# Patient Record
Sex: MALE | Race: ASIAN | NOT HISPANIC OR LATINO | URBAN - METROPOLITAN AREA
[De-identification: names, ages, dates, MRNs, and addresses within clinical notes are randomized per-mention and may not be internally consistent; named-entity substitution may affect disease eponyms.]

---

## 2018-11-15 ENCOUNTER — OFFICE VISIT (OUTPATIENT)
Dept: CARDIOLOGY | Facility: CLINIC | Age: 38
End: 2018-11-15
Payer: COMMERCIAL

## 2018-11-15 ENCOUNTER — HOSPITAL ENCOUNTER (OUTPATIENT)
Dept: CARDIOLOGY | Facility: CLINIC | Age: 38
Discharge: HOME | End: 2018-11-15
Payer: COMMERCIAL

## 2018-11-15 ENCOUNTER — LAB VISIT (OUTPATIENT)
Dept: LAB | Facility: HOSPITAL | Age: 38
End: 2018-11-15
Attending: INTERNAL MEDICINE
Payer: COMMERCIAL

## 2018-11-15 VITALS
HEIGHT: 67 IN | HEART RATE: 69 BPM | WEIGHT: 150.81 LBS | DIASTOLIC BLOOD PRESSURE: 81 MMHG | BODY MASS INDEX: 23.67 KG/M2 | SYSTOLIC BLOOD PRESSURE: 131 MMHG

## 2018-11-15 DIAGNOSIS — R00.2 PALPITATIONS: ICD-10-CM

## 2018-11-15 DIAGNOSIS — R00.2 PALPITATIONS: Primary | ICD-10-CM

## 2018-11-15 LAB
ANION GAP SERPL CALC-SCNC: 10 MMOL/L
BUN SERPL-MCNC: 22 MG/DL
CALCIUM SERPL-MCNC: 9.7 MG/DL
CHLORIDE SERPL-SCNC: 104 MMOL/L
CO2 SERPL-SCNC: 25 MMOL/L
CREAT SERPL-MCNC: 1 MG/DL
EST. GFR  (AFRICAN AMERICAN): >60 ML/MIN/1.73 M^2
EST. GFR  (NON AFRICAN AMERICAN): >60 ML/MIN/1.73 M^2
GLUCOSE SERPL-MCNC: 83 MG/DL
MAGNESIUM SERPL-MCNC: 2.3 MG/DL
POTASSIUM SERPL-SCNC: 3.7 MMOL/L
SODIUM SERPL-SCNC: 139 MMOL/L
TSH SERPL DL<=0.005 MIU/L-ACNC: 1.5 UIU/ML

## 2018-11-15 PROCEDURE — 93010 ELECTROCARDIOGRAM REPORT: CPT | Mod: S$GLB,,, | Performed by: INTERNAL MEDICINE

## 2018-11-15 PROCEDURE — 80048 BASIC METABOLIC PNL TOTAL CA: CPT

## 2018-11-15 PROCEDURE — 36415 COLL VENOUS BLD VENIPUNCTURE: CPT

## 2018-11-15 PROCEDURE — 93005 ELECTROCARDIOGRAM TRACING: CPT | Mod: S$GLB,,, | Performed by: INTERNAL MEDICINE

## 2018-11-15 PROCEDURE — 83735 ASSAY OF MAGNESIUM: CPT

## 2018-11-15 PROCEDURE — 99204 OFFICE O/P NEW MOD 45 MIN: CPT | Mod: S$GLB,,, | Performed by: INTERNAL MEDICINE

## 2018-11-15 PROCEDURE — 84443 ASSAY THYROID STIM HORMONE: CPT

## 2018-11-15 PROCEDURE — 99999 PR PBB SHADOW E&M-EST. PATIENT-LVL III: CPT | Mod: PBBFAC,,, | Performed by: INTERNAL MEDICINE

## 2018-11-15 PROCEDURE — 3008F BODY MASS INDEX DOCD: CPT | Mod: CPTII,S$GLB,, | Performed by: INTERNAL MEDICINE

## 2018-11-15 NOTE — PROGRESS NOTES
"Subjective:   Patient ID:  Mayur Garcia is a 38 y.o. 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 He first noticed them this past April while he was studying for his board exam. He also noted feeling stressed out and not sleeping at the time. He was also drinking more caffeine than usual. He reports feeling a "skipped" beat. His heart was not racing. The symptoms lasted on and off for a few weeks and then resolved. They recurred around September and occur every few days. He is currently drinking two cups of coffee a day. He stopped taking creatine. He does weight training routinely. He is otherwise asymptomatic. He has not had any syncopal events.    ECG: NSR 61 bpm with an incomplete right bundle branch block    History reviewed. No pertinent past medical history.    History reviewed. No pertinent surgical history.    Social History     Socioeconomic History    Marital status:      Spouse name: None    Number of children: None    Years of education: None    Highest education level: None   Social Needs    Financial resource strain: None    Food insecurity - worry: None    Food insecurity - inability: None    Transportation needs - medical: None    Transportation needs - non-medical: None   Occupational History    None   Tobacco Use    Smoking status: Never Smoker    Smokeless tobacco: Never Used   Substance and Sexual Activity    Alcohol use: No    Drug use: No    Sexual activity: None   Other Topics Concern    None   Social History Narrative    None       Family History   Problem Relation Age of Onset    Hypertension Father     Heart disease Father     Hyperlipidemia Father     Hypertension Maternal Grandmother     Diabetes Paternal Grandmother     Heart attack Neg Hx     Heart failure Neg Hx           Medication List      as of 11/15/2018  3:09 PM   " "  You have not been prescribed any medications.         Review of Systems   Constitution: Negative for weakness, malaise/fatigue and weight gain.   HENT: Negative for hearing loss.    Eyes: Negative for visual disturbance.   Cardiovascular: Positive for palpitations. Negative for chest pain, claudication, dyspnea on exertion, leg swelling, near-syncope, orthopnea, paroxysmal nocturnal dyspnea and syncope.   Respiratory: Negative for cough, shortness of breath, sleep disturbances due to breathing, snoring and wheezing.    Endocrine: Negative for cold intolerance, heat intolerance, polydipsia, polyphagia and polyuria.   Hematologic/Lymphatic: Negative for bleeding problem. Does not bruise/bleed easily.   Skin: Negative for rash and suspicious lesions.   Musculoskeletal: Negative for arthritis, falls, joint pain, muscle weakness and myalgias.   Gastrointestinal: Negative for abdominal pain, change in bowel habit, constipation, diarrhea, heartburn, hematochezia, melena and nausea.   Genitourinary: Negative for hematuria and nocturia.   Neurological: Negative for excessive daytime sleepiness, dizziness, headaches, light-headedness and loss of balance.   Psychiatric/Behavioral: Negative for depression. The patient is not nervous/anxious.    Allergic/Immunologic: Negative for environmental allergies.       /81 (BP Location: Left arm, Patient Position: Sitting, BP Method: Large (Automatic))   Pulse 69   Ht 5' 7" (1.702 m)   Wt 68.4 kg (150 lb 12.7 oz)   BMI 23.62 kg/m²     Objective:   Physical Exam   Constitutional: He is oriented to person, place, and time. He appears well-developed and well-nourished.        HENT:   Head: Normocephalic and atraumatic.   Mouth/Throat: Oropharynx is clear and moist.   Eyes: Conjunctivae and EOM are normal. Pupils are equal, round, and reactive to light. No scleral icterus.   Neck: Normal range of motion. Neck supple. No hepatojugular reflux and no JVD present. No tracheal " deviation present. No thyromegaly present.   Cardiovascular: Normal rate, regular rhythm, normal heart sounds and intact distal pulses. PMI is not displaced.   Pulses:       Carotid pulses are 2+ on the right side, and 2+ on the left side.       Radial pulses are 2+ on the right side, and 2+ on the left side.        Dorsalis pedis pulses are 2+ on the right side, and 2+ on the left side.        Posterior tibial pulses are 2+ on the right side, and 2+ on the left side.   Pulmonary/Chest: Effort normal and breath sounds normal.   Abdominal: Soft. Bowel sounds are normal. He exhibits no distension and no mass. There is no hepatosplenomegaly. There is no tenderness.   Musculoskeletal: He exhibits no edema or tenderness.   Lymphadenopathy:     He has no cervical adenopathy.   Neurological: He is alert and oriented to person, place, and time.   Skin: Skin is warm and dry. No rash noted. No cyanosis or erythema. Nails show no clubbing.   Psychiatric: He has a normal mood and affect. His speech is normal and behavior is normal.       No results found for: NA, K, CL, CO2, BUN, CREATININE, GLU, HGBA1C, MG, AST, ALT, ALBUMIN, PROT, BILITOT, WBC, HGB, HCT, MCV, PLT, INR, TSH, CHOL, HDL, LDLCALC, TRIG, BNP    Assessment:     1. Palpitations : It sounds like he is having PVC's or PAC's. I have ordered some baseline labs as well as a 24 hour holter monitor for further evaluation.       Plan:     Mayur was seen today for palpitations.    Diagnoses and all orders for this visit:    Palpitations  -     EKG 12-lead; Future  -     Holter monitor - 48 hour (Cupid Only); Future  -     TSH; Future  -     Basic metabolic panel; Future  -     Magnesium; Future        Thank you for allowing me to participate in this patient's care. Please do not hesitate to contact me with any questions or concerns.

## 2018-11-16 ENCOUNTER — PATIENT MESSAGE (OUTPATIENT)
Dept: CARDIOLOGY | Facility: CLINIC | Age: 38
End: 2018-11-16

## 2018-11-20 ENCOUNTER — CLINICAL SUPPORT (OUTPATIENT)
Dept: CARDIOLOGY | Facility: HOSPITAL | Age: 38
End: 2018-11-20
Attending: INTERNAL MEDICINE
Payer: COMMERCIAL

## 2018-11-20 DIAGNOSIS — R00.2 PALPITATIONS: ICD-10-CM

## 2018-11-20 PROCEDURE — 93227 XTRNL ECG REC<48 HR R&I: CPT | Mod: ,,, | Performed by: INTERNAL MEDICINE

## 2018-11-20 PROCEDURE — 93225 XTRNL ECG REC<48 HRS REC: CPT

## 2018-11-27 LAB
OHS CV EVENT MONITOR DAY: 0
OHS CV HOLTER LENGTH DECIMAL HOURS: 48
OHS CV HOLTER LENGTH HOURS: 48
OHS CV HOLTER LENGTH MINUTES: 0

## 2018-11-28 ENCOUNTER — PATIENT MESSAGE (OUTPATIENT)
Dept: CARDIOLOGY | Facility: CLINIC | Age: 38
End: 2018-11-28

## 2019-04-05 ENCOUNTER — TELEPHONE (OUTPATIENT)
Dept: OPTOMETRY | Facility: CLINIC | Age: 39
End: 2019-04-05

## 2019-04-05 NOTE — TELEPHONE ENCOUNTER
Called patient regarding his message to see  on 5/28/2019 at 7:30am informed him I left a message for  but it should be ok

## 2019-04-08 ENCOUNTER — TELEPHONE (OUTPATIENT)
Dept: OPTOMETRY | Facility: CLINIC | Age: 39
End: 2019-04-08

## 2019-05-28 ENCOUNTER — OFFICE VISIT (OUTPATIENT)
Dept: OPTOMETRY | Facility: CLINIC | Age: 39
End: 2019-05-28
Payer: COMMERCIAL

## 2019-05-28 ENCOUNTER — TELEPHONE (OUTPATIENT)
Dept: OPHTHALMOLOGY | Facility: CLINIC | Age: 39
End: 2019-05-28

## 2019-05-28 DIAGNOSIS — H40.003 GLAUCOMA SUSPECT OF BOTH EYES: ICD-10-CM

## 2019-05-28 DIAGNOSIS — Z13.5 SCREENING FOR EYE CONDITION: ICD-10-CM

## 2019-05-28 DIAGNOSIS — Z01.00 EXAMINATION OF EYES AND VISION: Primary | ICD-10-CM

## 2019-05-28 DIAGNOSIS — H52.13 MYOPIA OF BOTH EYES: ICD-10-CM

## 2019-05-28 PROCEDURE — 92015 PR REFRACTION: ICD-10-PCS | Mod: S$GLB,,, | Performed by: OPTOMETRIST

## 2019-05-28 PROCEDURE — 92250 FUNDUS PHOTOGRAPHY W/I&R: CPT | Mod: S$GLB,,, | Performed by: OPTOMETRIST

## 2019-05-28 PROCEDURE — 92250 COLOR FUNDUS PHOTOGRAPHY - OU - BOTH EYES: ICD-10-PCS | Mod: S$GLB,,, | Performed by: OPTOMETRIST

## 2019-05-28 PROCEDURE — 92015 DETERMINE REFRACTIVE STATE: CPT | Mod: S$GLB,,, | Performed by: OPTOMETRIST

## 2019-05-28 PROCEDURE — 99999 PR PBB SHADOW E&M-EST. PATIENT-LVL III: ICD-10-PCS | Mod: PBBFAC,,, | Performed by: OPTOMETRIST

## 2019-05-28 PROCEDURE — 99999 PR PBB SHADOW E&M-EST. PATIENT-LVL III: CPT | Mod: PBBFAC,,, | Performed by: OPTOMETRIST

## 2019-05-28 PROCEDURE — 92004 COMPRE OPH EXAM NEW PT 1/>: CPT | Mod: S$GLB,,, | Performed by: OPTOMETRIST

## 2019-05-28 PROCEDURE — 92004 PR EYE EXAM, NEW PATIENT,COMPREHESV: ICD-10-PCS | Mod: S$GLB,,, | Performed by: OPTOMETRIST

## 2019-05-28 NOTE — PATIENT INSTRUCTIONS
Glaucoma suspect in both eyes, based on the finding of larger-than-average cup-to-disc ratio in each eye, with normal intraocular pressure in each eye.  Dr. Garcia states that he has had visual field test done years ago.   Suggest repeat at this time..  Order stereo disc photos to be taken today.  Assistant to call to schedule Myers Visual Field (HVF) test (24-2 DEYSI standard) and OCT retinal nerve fiber layer thickness analysis, and follow-up visit with me (Dr. Jiménez) on the same date.    Otherwise, ocular health appears good in both eyes.    Slight myopia in each eye, with good best-corrected VA in each eye.  However, Dr. Garcia has very satisfactory unaided distance VA in each eye, so doubt compelling need to have spectacle lens Rx filled.    Repeat general eye exam and refraction in at least two years.

## 2019-05-28 NOTE — TELEPHONE ENCOUNTER
Called patient to schedule [facility] 24-2 [facility] std and an EP to see  [facility]    ----- Message from Serena Ponce sent at 5/28/2019 11:28 AM CDT -----  Contact: Mayur  Patient Returning Call from [facility]    Who Left Message for Patient:Ellis  Communication Preference:[phone]  Additional Information:  Mr. Garcia was returning your call. He can be reach at the number in the message.

## 2019-05-28 NOTE — PROGRESS NOTES
HPI     eye exam       Additional comments: In for general eye exam and refraction.  Notes no acute ocular/vision problems.  Distance VA (unaided) about the same, per Dr. Garcia.              Comments     Patient's age: 39 y.o. male  Occupation: resident in radiology   Approximate date of last eye examination:  12/2015  Name of last eye doctor seen: Dr. Cm  Wears glasses? no  Wears CLs?:  no    Headaches?  Tension headaches occasionally  Eye pain/discomfort?  no                                                                                     Flashes?  no  Floaters?  no  Diplopia/Double vision?  no  Patient's Ocular History:         Any eye surgeries? no         Any eye injury?  no         Any treatment for eye disease?  no  Family history of eye disease?  Grandmother:  cataracts  Significant patient medical history:         1. Diabetes?  no       If yes, IDDM or NIDDM? n/a   2. HBP?  no              3. Other (describe):  no   ! OTC eyedrops currently using:  no   ! Prescription eye meds currently using:  no   ! Any history of allergy/adverse reaction to any eye meds used   previously?  no    ! Any history of allergy/adverse reaction to eyedrops used during prior   eye exam(s)? no    ! Any history of allergy/adverse reaction to Novacaine or similar meds?   no   ! Any history of allergy/adverse reaction to Epinephrine or similar meds?   no    ! Patient okay with use of anesthetic eyedrops to check eye pressure?  no          ! Patient okay with use of eyedrops to dilate pupils today?  no   !  Allergies/Medications/Medical History/Family History reviewed today?    yes      PD =   64/60                                                                     Last edited by Landry Jiménez, OD on 5/28/2019  7:58 AM. (History)            Assessment /Plan     For exam results, see Encounter Report.    1. Examination of eyes and vision     2. Glaucoma suspect of both eyes  Color Fundus Photography - OU - Both Eyes     Myers Visual Field - OU - Extended - Both Eyes    Posterior Segment OCT Optic Nerve- Both eyes   3. Myopia of both eyes     4. Screening for eye condition                  Glaucoma suspect in both eyes, based on the finding of larger-than-average cup-to-disc ratio in each eye, with normal intraocular pressure in each eye.  Dr. Garcia states that he has had visual field test done years ago.   Suggest repeat at this time..  Order stereo disc photos to be taken today.  Assistant to call to schedule Myers Visual Field (HVF) test (24-2 DEYSI standard) and OCT retinal nerve fiber layer thickness analysis, and follow-up visit with me (Dr. Jiménez) on the same date.    Otherwise, ocular health appears good in both eyes.    Slight myopia in each eye, with good best-corrected VA in each eye.  However, Dr. Garcia has very satisfactory unaided distance VA in each eye, so doubt compelling need to have spectacle lens Rx filled.    Repeat general eye exam and refraction in at least two years.

## 2019-05-29 ENCOUNTER — OFFICE VISIT (OUTPATIENT)
Dept: OPTOMETRY | Facility: CLINIC | Age: 39
End: 2019-05-29
Payer: COMMERCIAL

## 2019-05-29 ENCOUNTER — CLINICAL SUPPORT (OUTPATIENT)
Dept: OPHTHALMOLOGY | Facility: CLINIC | Age: 39
End: 2019-05-29
Payer: COMMERCIAL

## 2019-05-29 DIAGNOSIS — H40.003 GLAUCOMA SUSPECT OF BOTH EYES: ICD-10-CM

## 2019-05-29 DIAGNOSIS — H40.003 GLAUCOMA SUSPECT OF BOTH EYES: Primary | ICD-10-CM

## 2019-05-29 PROCEDURE — 99999 PR PBB SHADOW E&M-EST. PATIENT-LVL II: ICD-10-PCS | Mod: PBBFAC,,, | Performed by: OPTOMETRIST

## 2019-05-29 PROCEDURE — 99499 UNLISTED E&M SERVICE: CPT | Mod: S$GLB,,, | Performed by: OPTOMETRIST

## 2019-05-29 PROCEDURE — 99999 PR PBB SHADOW E&M-EST. PATIENT-LVL II: CPT | Mod: PBBFAC,,, | Performed by: OPTOMETRIST

## 2019-05-29 PROCEDURE — 92083 EXTENDED VISUAL FIELD XM: CPT | Mod: S$GLB,,, | Performed by: OPTOMETRIST

## 2019-05-29 PROCEDURE — 92133 CPTRZD OPH DX IMG PST SGM ON: CPT | Mod: S$GLB,,, | Performed by: OPTOMETRIST

## 2019-05-29 PROCEDURE — 92083 HUMPHREY VISUAL FIELD - OU - BOTH EYES: ICD-10-PCS | Mod: S$GLB,,, | Performed by: OPTOMETRIST

## 2019-05-29 PROCEDURE — 92133 POSTERIOR SEGMENT OCT OPTIC NERVE(OCULAR COHERENCE TOMOGRAPHY) - OU - BOTH EYES: ICD-10-PCS | Mod: S$GLB,,, | Performed by: OPTOMETRIST

## 2019-05-29 PROCEDURE — 99499 NO LOS: ICD-10-PCS | Mod: S$GLB,,, | Performed by: OPTOMETRIST

## 2019-05-29 NOTE — PROGRESS NOTES
Assessment /Plan     For exam results, see Encounter Report.    1. Glaucoma suspect of both eyes                      Refer to previous notes dated 05/28/2019.  Prior diagnosis of bilateral glaucoma suspect, based on the finding of larger-than-average C/D ratios in both eyes, with normal IOP in each eye.  Dr. Garcia in today for review of results of HVF test (24-2) and OCT retinal nerve fiber layer (RNFL) thickness analysis done today.    RNFL thckness within normal range 360° and globally in each eye.    Normal HVF test results in each eye.   No evidence of glaucomatous visual field test results in either eye.    Discussed findings with Dr. Garcia.  No need to initiate treatment for IOP control/reduction in either eye.  Continue to monitor on regular basis.  Gave copy of OCT RNFL thickness analysis results and copy of Myers visual field test results to Dr. Garcia for his records, and for use as baseline in the future.

## 2019-05-29 NOTE — PATIENT INSTRUCTIONS
Refer to previous notes dated 05/28/2019.  Prior diagnosis of bilateral glaucoma suspect, based on the finding of larger-than-average C/D ratios in both eyes, with normal IOP in each eye.  Dr. Garcia in today for review of results of HVF test (24-2) and OCT retinal nerve fiber layer (RNFL) thickness analysis done today.    RNFL thckness within normal range 360° and globally in each eye.    Normal HVF test results in each eye.   No evidence of glaucomatous visual field test results in either eye.    Discussed findings with Dr. Garcia.  No need to initiate treatment for IOP control/reduction in either eye.  Continue to monitor on regular basis.  Gave copy of OCT RNFL thickness analysis results and copy of Myers visual field test results to Dr. Garcia for his records, and for use as baseline in the future.

## 2019-05-29 NOTE — PROGRESS NOTES
24-2  Ss done ou   Oct done ou     Rel & Fix =  Good ou      Coop =  Good     Patient denies any allergies to eye patch     Dr Jiménez patient      jthomas